# Patient Record
Sex: MALE | Race: OTHER | NOT HISPANIC OR LATINO | URBAN - METROPOLITAN AREA
[De-identification: names, ages, dates, MRNs, and addresses within clinical notes are randomized per-mention and may not be internally consistent; named-entity substitution may affect disease eponyms.]

---

## 2023-02-15 ENCOUNTER — EMERGENCY (EMERGENCY)
Facility: HOSPITAL | Age: 23
LOS: 1 days | Discharge: HOME | End: 2023-02-15
Attending: EMERGENCY MEDICINE | Admitting: EMERGENCY MEDICINE
Payer: OTHER MISCELLANEOUS

## 2023-02-15 VITALS
TEMPERATURE: 98 F | SYSTOLIC BLOOD PRESSURE: 124 MMHG | DIASTOLIC BLOOD PRESSURE: 81 MMHG | WEIGHT: 162.04 LBS | OXYGEN SATURATION: 97 % | HEART RATE: 86 BPM | RESPIRATION RATE: 19 BRPM

## 2023-02-15 PROCEDURE — 99284 EMERGENCY DEPT VISIT MOD MDM: CPT

## 2023-02-15 PROCEDURE — 73630 X-RAY EXAM OF FOOT: CPT | Mod: 26,RT

## 2023-02-15 PROCEDURE — 73610 X-RAY EXAM OF ANKLE: CPT | Mod: 26,RT

## 2023-02-15 RX ORDER — METHOCARBAMOL 500 MG/1
750 TABLET, FILM COATED ORAL ONCE
Refills: 0 | Status: COMPLETED | OUTPATIENT
Start: 2023-02-15 | End: 2023-02-15

## 2023-02-15 RX ORDER — IBUPROFEN 200 MG
600 TABLET ORAL ONCE
Refills: 0 | Status: COMPLETED | OUTPATIENT
Start: 2023-02-15 | End: 2023-02-15

## 2023-02-15 RX ORDER — IBUPROFEN 200 MG
1 TABLET ORAL
Qty: 20 | Refills: 0
Start: 2023-02-15 | End: 2023-02-19

## 2023-02-15 RX ORDER — METHOCARBAMOL 500 MG/1
1 TABLET, FILM COATED ORAL
Qty: 9 | Refills: 0
Start: 2023-02-15 | End: 2023-02-17

## 2023-02-15 RX ADMIN — METHOCARBAMOL 750 MILLIGRAM(S): 500 TABLET, FILM COATED ORAL at 16:55

## 2023-02-15 RX ADMIN — Medication 600 MILLIGRAM(S): at 16:55

## 2023-02-15 RX ADMIN — Medication 600 MILLIGRAM(S): at 17:30

## 2023-02-15 NOTE — ED ADULT NURSE NOTE - OBJECTIVE STATEMENT
pt is 22y male, here for R ankle injury, pt reports he twisted his ankle while walking down the stairs, now reports difficulty bearing weight, R ankle noted with mild swelling, no obvious deformity, CMS intact, NAD present

## 2023-02-15 NOTE — ED ADULT TRIAGE NOTE - CHIEF COMPLAINT QUOTE
Pt w/ right foot injury from work when going down the stairs.  Hard time bearing weight. No falls, head strike or LOC. States he twisted his foot.

## 2023-02-15 NOTE — ED PROVIDER NOTE - MUSCULOSKELETAL, MLM
Mild Tenderness to palpation over the R lateral malleolus. No swelling. No gross deformities. FROM. No sensory deficits.

## 2023-02-15 NOTE — ED ADULT NURSE NOTE - NSIMPLEMENTINTERV_GEN_ALL_ED
Implemented All Fall Risk Interventions:  [place] to call system. Call bell, personal items and telephone within reach. Instruct patient to call for assistance. Room bathroom lighting operational. Non-slip footwear when patient is off stretcher. Physically safe environment: no spills, clutter or unnecessary equipment. Stretcher in lowest position, wheels locked, appropriate side rails in place. Provide visual cue, wrist band, yellow gown, etc. Monitor gait and stability. Monitor for mental status changes and reorient to person, place, and time. Review medications for side effects contributing to fall risk. Reinforce activity limits and safety measures with patient and family.

## 2023-02-15 NOTE — ED PROVIDER NOTE - CLINICAL SUMMARY MEDICAL DECISION MAKING FREE TEXT BOX
21 y/o M presenting with R ankle pain. Exam is remarkable for Mild Tenderness to palpation over the R lateral malleolus. No swelling. No gross deformities. FROM. No sensory deficits. Plan for Motrin, Robaxin, and XR imaging. Will reassess clinically after results have been obtained. 21 y/o M presenting with R ankle pain. Exam is remarkable for Mild Tenderness to palpation over the R lateral malleolus. No swelling. No gross deformities. FROM. No sensory deficits. Plan for Motrin, Robaxin, and XR imaging. Will reassess clinically after results have been obtained.   XRs unremarkable with no acute findings on wet read. Motrin and Robaxin given and prescription sent. ACE bandage applied. Educated on RICE/ROM exercises. Discharge and return to ED instructions given.

## 2023-02-15 NOTE — ED PROVIDER NOTE - OBJECTIVE STATEMENT
23 y/o M with no PMH presents to the ED for R ankle pain. Pt reports he was walking down the stairs when he twisted his ankle and inverted the R foot. Pt subsequently fell down a few steps. He did not have head strike or LOC after the incident. Pt was limping after the incident and endorses pain with weight bearing. He denies numbness, tingling, weakness, or any additional injuries.

## 2023-02-17 DIAGNOSIS — M25.571 PAIN IN RIGHT ANKLE AND JOINTS OF RIGHT FOOT: ICD-10-CM
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
## 2024-08-30 NOTE — ED PROVIDER NOTE - PATIENT PORTAL LINK FT
You can access the FollowMyHealth Patient Portal offered by [facility] by registering at the following website: [email or web address]. By joining [employer]’s FollowMyHealth portal, you will also be able to view your health information using other applications (apps) compatible with our system.
OR[facility]